# Patient Record
Sex: FEMALE | Race: WHITE | URBAN - METROPOLITAN AREA
[De-identification: names, ages, dates, MRNs, and addresses within clinical notes are randomized per-mention and may not be internally consistent; named-entity substitution may affect disease eponyms.]

---

## 2017-09-13 ENCOUNTER — IMPORTED ENCOUNTER (OUTPATIENT)
Dept: URBAN - METROPOLITAN AREA CLINIC 50 | Facility: CLINIC | Age: 82
End: 2017-09-13

## 2018-03-14 ENCOUNTER — IMPORTED ENCOUNTER (OUTPATIENT)
Dept: URBAN - METROPOLITAN AREA CLINIC 50 | Facility: CLINIC | Age: 83
End: 2018-03-14

## 2018-09-12 ENCOUNTER — IMPORTED ENCOUNTER (OUTPATIENT)
Dept: URBAN - METROPOLITAN AREA CLINIC 50 | Facility: CLINIC | Age: 83
End: 2018-09-12

## 2018-09-12 NOTE — PATIENT DISCUSSION
"""Continue Warm Compresses both eyes twice a day. "" ""Continue Artificial Tears both eyes two - four times a day.  Systane PF"""

## 2019-03-13 ENCOUNTER — IMPORTED ENCOUNTER (OUTPATIENT)
Dept: URBAN - METROPOLITAN AREA CLINIC 50 | Facility: CLINIC | Age: 84
End: 2019-03-13

## 2019-09-25 ENCOUNTER — IMPORTED ENCOUNTER (OUTPATIENT)
Dept: URBAN - METROPOLITAN AREA CLINIC 50 | Facility: CLINIC | Age: 84
End: 2019-09-25

## 2019-10-10 ENCOUNTER — IMPORTED ENCOUNTER (OUTPATIENT)
Dept: URBAN - METROPOLITAN AREA CLINIC 50 | Facility: CLINIC | Age: 84
End: 2019-10-10

## 2019-10-11 ENCOUNTER — IMPORTED ENCOUNTER (OUTPATIENT)
Dept: URBAN - METROPOLITAN AREA CLINIC 50 | Facility: CLINIC | Age: 84
End: 2019-10-11

## 2019-10-14 ENCOUNTER — IMPORTED ENCOUNTER (OUTPATIENT)
Dept: URBAN - METROPOLITAN AREA CLINIC 50 | Facility: CLINIC | Age: 84
End: 2019-10-14

## 2019-10-24 ENCOUNTER — IMPORTED ENCOUNTER (OUTPATIENT)
Dept: URBAN - METROPOLITAN AREA CLINIC 50 | Facility: CLINIC | Age: 84
End: 2019-10-24

## 2020-02-13 ENCOUNTER — IMPORTED ENCOUNTER (OUTPATIENT)
Dept: URBAN - METROPOLITAN AREA CLINIC 50 | Facility: CLINIC | Age: 85
End: 2020-02-13

## 2020-08-11 ENCOUNTER — IMPORTED ENCOUNTER (OUTPATIENT)
Dept: URBAN - METROPOLITAN AREA CLINIC 50 | Facility: CLINIC | Age: 85
End: 2020-08-11

## 2020-08-11 NOTE — PATIENT DISCUSSION
"""Continue Artificial Tears both eyes twice a day ."" ""Continue Warm compresses both eyes one drop twice a day . """

## 2021-02-09 ENCOUNTER — IMPORTED ENCOUNTER (OUTPATIENT)
Dept: URBAN - METROPOLITAN AREA CLINIC 50 | Facility: CLINIC | Age: 86
End: 2021-02-09

## 2021-04-17 ASSESSMENT — VISUAL ACUITY
OD_CC: 20/30-
OD_CC: 20/30+
OS_CC: J1
OS_CC: J1
OS_CC: 20/25-1
OD_CC: J1+
OS_CC: 20/25-1
OS_CC: 20/25-2
OS_CC: J1+
OD_CC: 20/30+
OD_CC: 20/25-
OD_CC: 20/25-1
OS_CC: 20/30+
OD_CC: J1
OS_CC: 20/30+
OS_CC: 20/30
OS_CC: 20/30
OD_CC: 20/25-2
OD_CC: 20/25+1
OD_CC: 20/30
OD_CC: J1+
OD_CC: 20/25-2
OS_CC: 20/30-
OD_CC: J1
OS_PH: 20/25
OS_CC: J1
OD_CC: J1
OD_CC: 20/25+
OS_CC: 20/25-2
OS_CC: 20/25
OS_PH: 20/25
OS_CC: J1+

## 2021-04-17 ASSESSMENT — TONOMETRY
OD_IOP_MMHG: 15
OD_IOP_MMHG: 11
OD_IOP_MMHG: 15
OS_IOP_MMHG: 11
OS_IOP_MMHG: 12
OS_IOP_MMHG: 15
OD_IOP_MMHG: 11
OS_IOP_MMHG: 12
OS_IOP_MMHG: 12
OS_IOP_MMHG: 13
OD_IOP_MMHG: 12
OS_IOP_MMHG: 14
OD_IOP_MMHG: 12
OS_IOP_MMHG: 12
OS_IOP_MMHG: 12
OD_IOP_MMHG: 13
OD_IOP_MMHG: 12
OS_IOP_MMHG: 12

## 2022-04-29 ENCOUNTER — PREPPED CHART (OUTPATIENT)
Dept: URBAN - METROPOLITAN AREA CLINIC 53 | Facility: CLINIC | Age: 87
End: 2022-04-29

## 2022-05-03 ENCOUNTER — COMPREHENSIVE EXAM (OUTPATIENT)
Dept: URBAN - METROPOLITAN AREA CLINIC 53 | Facility: CLINIC | Age: 87
End: 2022-05-03

## 2022-05-03 DIAGNOSIS — H04.123: ICD-10-CM

## 2022-05-03 DIAGNOSIS — H35.3131: ICD-10-CM

## 2022-05-03 DIAGNOSIS — E11.9: ICD-10-CM

## 2022-05-03 DIAGNOSIS — H43.813: ICD-10-CM

## 2022-05-03 PROCEDURE — 92014 COMPRE OPH EXAM EST PT 1/>: CPT

## 2022-05-03 PROCEDURE — 92134 CPTRZ OPH DX IMG PST SGM RTA: CPT

## 2022-05-03 ASSESSMENT — VISUAL ACUITY
OD_CC: 20/25-2
OU_CC: J1
OS_CC: 20/30-1
OS_PH: 20/25-1

## 2022-05-03 ASSESSMENT — TONOMETRY
OS_IOP_MMHG: 13
OD_IOP_MMHG: 13

## 2023-05-09 ENCOUNTER — COMPREHENSIVE EXAM (OUTPATIENT)
Dept: URBAN - METROPOLITAN AREA CLINIC 53 | Facility: CLINIC | Age: 88
End: 2023-05-09

## 2023-05-09 DIAGNOSIS — H35.3131: ICD-10-CM

## 2023-05-09 DIAGNOSIS — H43.813: ICD-10-CM

## 2023-05-09 DIAGNOSIS — H04.123: ICD-10-CM

## 2023-05-09 DIAGNOSIS — R73.03: ICD-10-CM

## 2023-05-09 DIAGNOSIS — H52.4: ICD-10-CM

## 2023-05-09 PROCEDURE — 92015 DETERMINE REFRACTIVE STATE: CPT

## 2023-05-09 PROCEDURE — 92134 CPTRZ OPH DX IMG PST SGM RTA: CPT

## 2023-05-09 PROCEDURE — 92014 COMPRE OPH EXAM EST PT 1/>: CPT

## 2023-05-09 ASSESSMENT — VISUAL ACUITY
OS_CC: 20/40
OU_CC: J1@16"
OD_CC: 20/30

## 2023-05-09 ASSESSMENT — TONOMETRY
OD_IOP_MMHG: 14
OS_IOP_MMHG: 13

## 2024-05-21 ENCOUNTER — COMPREHENSIVE EXAM (OUTPATIENT)
Dept: URBAN - METROPOLITAN AREA CLINIC 53 | Facility: CLINIC | Age: 89
End: 2024-05-21

## 2024-05-21 DIAGNOSIS — R73.03: ICD-10-CM

## 2024-05-21 DIAGNOSIS — H43.813: ICD-10-CM

## 2024-05-21 DIAGNOSIS — H18.593: ICD-10-CM

## 2024-05-21 DIAGNOSIS — H52.4: ICD-10-CM

## 2024-05-21 DIAGNOSIS — H04.123: ICD-10-CM

## 2024-05-21 DIAGNOSIS — H35.3131: ICD-10-CM

## 2024-05-21 DIAGNOSIS — H35.361: ICD-10-CM

## 2024-05-21 PROCEDURE — 99214 OFFICE O/P EST MOD 30 MIN: CPT

## 2024-05-21 PROCEDURE — 92015 DETERMINE REFRACTIVE STATE: CPT

## 2024-05-21 PROCEDURE — 92134 CPTRZ OPH DX IMG PST SGM RTA: CPT

## 2024-05-21 ASSESSMENT — VISUAL ACUITY
OD_CC: 20/30
OS_CC: 20/50+1
OU_CC: J1+ (-)

## 2024-05-21 ASSESSMENT — TONOMETRY
OS_IOP_MMHG: 14
OD_IOP_MMHG: 13

## 2025-06-03 ENCOUNTER — COMPREHENSIVE EXAM (OUTPATIENT)
Age: OVER 89
End: 2025-06-03

## 2025-06-03 DIAGNOSIS — H35.3131: ICD-10-CM

## 2025-06-03 DIAGNOSIS — H35.361: ICD-10-CM

## 2025-06-03 DIAGNOSIS — H43.813: ICD-10-CM

## 2025-06-03 DIAGNOSIS — H18.593: ICD-10-CM

## 2025-06-03 DIAGNOSIS — H04.123: ICD-10-CM

## 2025-06-03 DIAGNOSIS — R73.03: ICD-10-CM

## 2025-06-03 DIAGNOSIS — H52.4: ICD-10-CM

## 2025-06-03 PROCEDURE — 99214 OFFICE O/P EST MOD 30 MIN: CPT

## 2025-06-03 PROCEDURE — 92134 CPTRZ OPH DX IMG PST SGM RTA: CPT
